# Patient Record
Sex: MALE | Race: WHITE | NOT HISPANIC OR LATINO | Employment: FULL TIME | URBAN - METROPOLITAN AREA
[De-identification: names, ages, dates, MRNs, and addresses within clinical notes are randomized per-mention and may not be internally consistent; named-entity substitution may affect disease eponyms.]

---

## 2017-02-25 ENCOUNTER — OFFICE VISIT (OUTPATIENT)
Dept: URGENT CARE | Facility: CLINIC | Age: 22
End: 2017-02-25
Payer: COMMERCIAL

## 2017-02-25 VITALS
TEMPERATURE: 98.2 F | BODY MASS INDEX: 27.32 KG/M2 | RESPIRATION RATE: 18 BRPM | WEIGHT: 170 LBS | OXYGEN SATURATION: 99 % | HEART RATE: 120 BPM | DIASTOLIC BLOOD PRESSURE: 72 MMHG | SYSTOLIC BLOOD PRESSURE: 128 MMHG | HEIGHT: 66 IN

## 2017-02-25 DIAGNOSIS — J32.9 OTHER SINUSITIS: ICD-10-CM

## 2017-02-25 PROCEDURE — 99214 OFFICE O/P EST MOD 30 MIN: CPT | Performed by: PHYSICIAN ASSISTANT

## 2017-02-25 RX ORDER — AZITHROMYCIN 250 MG/1
TABLET, FILM COATED ORAL
Qty: 6 TAB | Refills: 1 | Status: SHIPPED | OUTPATIENT
Start: 2017-02-25 | End: 2018-06-25

## 2017-02-25 RX ORDER — CIPROFLOXACIN HYDROCHLORIDE 3.5 MG/ML
SOLUTION/ DROPS TOPICAL
Qty: 5 ML | Refills: 0 | Status: SHIPPED | OUTPATIENT
Start: 2017-02-25 | End: 2018-06-25

## 2017-02-25 RX ORDER — GUAIFENESIN, PSEUDOEPHEDRINE HYDROCHLORIDE 600; 60 MG/1; MG/1
1 TABLET, EXTENDED RELEASE ORAL EVERY 12 HOURS
Qty: 14 TAB | Refills: 0 | Status: SHIPPED | OUTPATIENT
Start: 2017-02-25 | End: 2018-06-25

## 2017-02-25 ASSESSMENT — ENCOUNTER SYMPTOMS
SORE THROAT: 0
HEADACHES: 1
EYES NEGATIVE: 1
RESPIRATORY NEGATIVE: 1
CARDIOVASCULAR NEGATIVE: 1
SINUS PRESSURE: 1
COUGH: 0
CONSTITUTIONAL NEGATIVE: 1

## 2017-02-25 NOTE — MR AVS SNAPSHOT
"        Preez Julian   2017 4:30 PM   Office Visit   MRN: [id number]    Department:  [facility]   Dept Phone:  [phone]    Description:  Male : 1995   Provider:  John Grande PA-C           Reason for Visit     Nasal Congestion x 1 week having nasal congestion and drainage, cough with mucus, post nasal drip, eye discharge       Allergies as of 2017     Allergen Noted Reactions    Lorabid [Loracarbef] 2016       Penicillins 2016         You were diagnosed with     Other sinusitis   [[id number]]         Vital Signs     Blood Pressure Pulse Temperature Respirations Height Weight    128/72 mmHg 120 36.8 °C (98.2 °F) 18 1.676 m (5' 6\") 77.111 kg (170 lb)    Body Mass Index Oxygen Saturation Smoking Status             27.45 kg/m2 99% Never Smoker          Basic Information     Date Of Birth Sex Race Ethnicity Preferred Language    1995 Male White Non- English      Health Maintenance        Date Due Completion Dates    IMM HEP B VACCINE (1 of 3 - Primary Series) 1993 ---    IMM HEP A VACCINE (1 of 2 - Standard Series) 1994 ---    IMM HPV VACCINE (1 of 3 - Male 3 Dose Series) 2004 ---    IMM VARICELLA (CHICKENPOX) VACCINE (1 of 2 - 2 Dose Adolescent Series) 2006 ---    IMM DTaP/Tdap/Td Vaccine (1 - Tdap) 2012 ---    IMM INFLUENZA (1) 2016 ---            Current Immunizations     No immunizations on file.      Below and/or attached are the medications your provider expects you to take. Review all of your home medications and newly ordered medications with your provider and/or pharmacist. Follow medication instructions as directed by your provider and/or pharmacist. Please keep your medication list with you and share with your provider. Update the information when medications are discontinued, doses are changed, or new medications (including over-the-counter products) are added; and carry medication information at all times in the event of " emergency situations     Allergies:  LORABID - (reactions not documented)     PENICILLINS - (reactions not documented)               Medications  Valid as of: February 25, 2017 -  5:40 PM    Generic Name Brand Name Tablet Size Instructions for use    Azithromycin (Tab) ZITHROMAX 250 MG 2 tabs day number one then one tab daily for remaining 4 days        Azithromycin (Tab) ZITHROMAX 250 MG z-zahraa; U.D.        Cetirizine HCl   Take  by mouth.        Ciprofloxacin HCl (Solution) CILOXIN 0.3 % Place 1 gtt into affected eye[s] q3hrs        Omeprazole (CAPSULE DELAYED RELEASE) PRILOSEC 20 MG Take 1 Cap by mouth every day.        Pseudoephedrine-Guaifenesin (TABLET SR 12 HR) MUCINEX D  MG Take 1 Tab by mouth every 12 hours.        Sucralfate (Tab) CARAFATE 1 GM Take 1 Tab by mouth 4 Times a Day,Before Meals and at Bedtime.        .                 Medicines prescribed today were sent to:     [facility] [address] [place] [address] [address] [address] [address] [facility] [address]    Phone: [phone] Fax: [phone]    Open 24 Hours?: Yes      Medication refill instructions:       If your prescription bottle indicates you have medication refills left, it is not necessary to call your provider’s office. Please contact your pharmacy and they will refill your medication.    If your prescription bottle indicates you do not have any refills left, you may request refills at any time through one of the following ways: The online TESARO system (except Urgent Care), by calling your provider’s office, or by asking your pharmacy to contact your provider’s office with a refill request. Medication refills are processed only during regular business hours and may not be available until the next business day. Your provider may request additional information or to have a follow-up visit with you prior to refilling your medication.   *Please Note: Medication refills are assigned a new Rx number  when refilled electronically. Your pharmacy may indicate that no refills were authorized even though a new prescription for the same medication is available at the pharmacy. Please request the medicine by name with the pharmacy before contacting your provider for a refill.           [employer] Access Code: [email or web address]  Expires: 3/27/2017  5:40 PM    [employer]  A secure, online tool to manage your health information     [employer]’s [employer]® is a secure, online tool that connects you to your personalized health information from the privacy of your home -- day or night - making it very easy for you to manage your healthcare. Once the activation process is completed, you can even access your medical information using the [employer] samir, which is available for free in the Apple Samir store or Google Play store.     [employer] provides the following levels of access (as shown below):   My Chart Features   [facility] Doctor [facility]  Specialists [facility]  Urgent  Care Non-[facility]  Primary Care  Doctor   Email your healthcare team securely and privately 24/7 X X X    Manage appointments: schedule your next appointment; view details of past/upcoming appointments X      Request prescription refills. X      View recent personal medical records, including lab and immunizations X X X X   View health record, including health history, allergies, medications X X X X   Read reports about your outpatient visits, procedures, consult and ER notes X X X X   See your discharge summary, which is a recap of your hospital and/or ER visit that includes your diagnosis, lab results, and care plan. X X       How to register for [employer]:  1. Go to  [email or web address].  2. Click on the Sign Up Now box, which takes you to the New Member Sign Up page. You will need to provide the following information:  a. Enter your [employer] Access Code exactly as it appears at the top of this page. (You will not need to use this code after you’ve  completed the sign-up process. If you do not sign up before the expiration date, you must request a new code.)   b. Enter your date of birth.   c. Enter your home email address.   d. Click Submit, and follow the next screen’s instructions.  3. Create a [employer] ID. This will be your [employer] login ID and cannot be changed, so think of one that is secure and easy to remember.  4. Create a [employer] password. You can change your password at any time.  5. Enter your Password Reset Question and Answer. This can be used at a later time if you forget your password.   6. Enter your e-mail address. This allows you to receive e-mail notifications when new information is available in [employer].  7. Click Sign Up. You can now view your health information.    For assistance activating your [employer] account, call [phone]

## 2017-02-26 NOTE — PROGRESS NOTES
"Subjective:      Perez Jordan is a 21 y.o. male who presents with Nasal Congestion            Sinus Problem  This is a new problem. The current episode started in the past 7 days. The problem is unchanged. There has been no fever. The pain is moderate. Associated symptoms include congestion, headaches and sinus pressure. Pertinent negatives include no coughing or sore throat. Past treatments include nothing. The treatment provided no relief.       Review of Systems   Constitutional: Negative.    HENT: Positive for congestion and sinus pressure. Negative for sore throat.    Eyes: Negative.    Respiratory: Negative.  Negative for cough.    Cardiovascular: Negative.    Skin: Negative.    Neurological: Positive for headaches.          Objective:     /72 mmHg  Pulse 120  Temp(Src) 36.8 °C (98.2 °F)  Resp 18  Ht 1.676 m (5' 6\")  Wt 77.111 kg (170 lb)  BMI 27.45 kg/m2  SpO2 99%     Physical Exam   Constitutional: He is oriented to person, place, and time. He appears well-developed and well-nourished. No distress.   HENT:   Head: Normocephalic and atraumatic.   +maxill.sinus press.     Eyes: EOM are normal. Pupils are equal, round, and reactive to light.   Neck: Normal range of motion. Neck supple.   Cardiovascular: Normal rate.    Pulmonary/Chest: Effort normal. No respiratory distress.   Lymphadenopathy:     He has no cervical adenopathy.   Neurological: He is alert and oriented to person, place, and time.   Skin: Skin is warm and dry.   Psychiatric: He has a normal mood and affect. His behavior is normal. Judgment and thought content normal.   Nursing note and vitals reviewed.    Filed Vitals:    02/25/17 1719   BP: 128/72   Pulse: 120   Temp: 36.8 °C (98.2 °F)   Resp: 18   Height: 1.676 m (5' 6\")   Weight: 77.111 kg (170 lb)   SpO2: 99%     Active Ambulatory Problems     Diagnosis Date Noted   • No Active Ambulatory Problems     Resolved Ambulatory Problems     Diagnosis Date Noted   • No Resolved " Ambulatory Problems     No Additional Past Medical History     Current Outpatient Prescriptions on File Prior to Visit   Medication Sig Dispense Refill   • Cetirizine HCl (ZYRTEC ALLERGY PO) Take  by mouth.     • azithromycin (ZITHROMAX) 250 MG Tab 2 tabs day number one then one tab daily for remaining 4 days 6 Tab 0   • omeprazole (PRILOSEC) 20 MG delayed-release capsule Take 1 Cap by mouth every day. 14 Cap 0   • sucralfate (CARAFATE) 1 GM Tab Take 1 Tab by mouth 4 Times a Day,Before Meals and at Bedtime. 60 Tab 0     No current facility-administered medications on file prior to visit.     Gargles, Cepacol lozenges, Aleve/Advil as needed for throat pain  History reviewed. No pertinent family history.  Lorabid and Penicillins              Assessment/Plan:     ·  sinusitis      · MucinexD; nsaids; ceftin rx

## 2018-02-21 ENCOUNTER — OFFICE VISIT (OUTPATIENT)
Dept: URGENT CARE | Facility: CLINIC | Age: 23
End: 2018-02-21
Payer: COMMERCIAL

## 2018-02-21 VITALS
HEIGHT: 66 IN | SYSTOLIC BLOOD PRESSURE: 140 MMHG | OXYGEN SATURATION: 98 % | HEART RATE: 88 BPM | DIASTOLIC BLOOD PRESSURE: 88 MMHG | RESPIRATION RATE: 20 BRPM | TEMPERATURE: 97.2 F | WEIGHT: 172 LBS | BODY MASS INDEX: 27.64 KG/M2

## 2018-02-21 DIAGNOSIS — H61.23 BILATERAL IMPACTED CERUMEN: Primary | ICD-10-CM

## 2018-02-21 PROCEDURE — 99213 OFFICE O/P EST LOW 20 MIN: CPT | Performed by: PHYSICIAN ASSISTANT

## 2018-02-21 NOTE — PATIENT INSTRUCTIONS
Cerumen Impaction  The structures of the external ear canal secrete a waxy substance known as cerumen. Excess cerumen can build up in the ear canal, causing a condition known as cerumen impaction. Cerumen impaction can cause ear pain and disrupt the function of the ear.  The rate of cerumen production differs for each individual. In certain individuals, the configuration of the ear canal may decrease his or her ability to naturally remove cerumen.  CAUSES  Cerumen impaction is caused by excessive cerumen production or buildup.  RISK FACTORS  · Frequent use of swabs to clean ears.  · Having narrow ear canals.  · Having eczema.  · Being dehydrated.  SIGNS AND SYMPTOMS  · Diminished hearing.  · Ear drainage.  · Ear pain.  · Ear itch.  TREATMENT  Treatment may involve:  · Over-the-counter or prescription ear drops to soften the cerumen.  · Removal of cerumen by a health care provider. This may be done with:  ¨ Irrigation with warm water. This is the most common method of removal.  ¨ Ear curettes and other instruments.  ¨ Surgery. This may be done in severe cases.  HOME CARE INSTRUCTIONS  · Take medicines only as directed by your health care provider.  · Do not insert objects into the ear with the intent of cleaning the ear.  PREVENTION  · Do not insert objects into the ear, even with the intent of cleaning the ear. Removing cerumen as a part of normal hygiene is not necessary, and the use of swabs in the ear canal is not recommended.  · Drink enough water to keep your urine clear or pale yellow.  · Control your eczema if you have it.  SEEK MEDICAL CARE IF:  · You develop ear pain.  · You develop bleeding from the ear.  · The cerumen does not clear after you use ear drops as directed.     This information is not intended to replace advice given to you by your health care provider. Make sure you discuss any questions you have with your health care provider.     Document Released: 01/25/2006 Document Revised: 01/08/2016  Document Reviewed: 03/17/2011  [employer] Patient Education ©2016 [employer]

## 2018-02-21 NOTE — PROGRESS NOTES
Subjective:      Pt is a 22 y.o. male who presents with Ear Fullness (Bilateral ears wax)            HPI  Pt notes B/L ear fullness and states that he gets ear wax build up and now notes an issue. Pt has not taken any Rx medications for this condition. Pt states the pain is a 1/10, aching in nature and worse at night. Pt denies CP, SOB, NVD, paresthesias, headaches, dizziness, change in vision, hives, or other joint pain. The pt's medication list, problem list, and allergies have been evaluated and reviewed during today's visit.    PMH:  Negative per pt.      PSH:  Negative per pt.      Fam Hx:  the patient's family history is not pertinent to their current complaint    Soc HX:  Social History     Social History   • Marital status: Unknown     Spouse name: N/A   • Number of children: N/A   • Years of education: N/A     Occupational History   • Not on file.     Social History Main Topics   • Smoking status: Never Smoker   • Smokeless tobacco: Never Used   • Alcohol use Not on file   • Drug use: Unknown   • Sexual activity: Not on file     Other Topics Concern   • Not on file     Social History Narrative   • No narrative on file         Medications:    Current Outpatient Prescriptions:   •  pseudoephedrine-guaifenesin (MUCINEX D)  MG per tablet, Take 1 Tab by mouth every 12 hours., Disp: 14 Tab, Rfl: 0  •  ciprofloxacin (CILOXIN) 0.3 % Solution, Place 1 gtt into affected eye[s] q3hrs, Disp: 5 mL, Rfl: 0  •  azithromycin (ZITHROMAX) 250 MG Tab, z-zahraa; U.D., Disp: 6 Tab, Rfl: 1  •  Cetirizine HCl (ZYRTEC ALLERGY PO), Take  by mouth., Disp: , Rfl:   •  azithromycin (ZITHROMAX) 250 MG Tab, 2 tabs day number one then one tab daily for remaining 4 days, Disp: 6 Tab, Rfl: 0  •  omeprazole (PRILOSEC) 20 MG delayed-release capsule, Take 1 Cap by mouth every day., Disp: 14 Cap, Rfl: 0  •  sucralfate (CARAFATE) 1 GM Tab, Take 1 Tab by mouth 4 Times a Day,Before Meals and at Bedtime., Disp: 60 Tab, Rfl:  "0      Allergies:  Lorabid [loracarbef] and Penicillins    ROS  Constitutional: Negative for fever, chills and malaise/fatigue.   HENT: Negative for congestion and sore throat.  B/L cerumen Impaction  Eyes: Negative for blurred vision, double vision and photophobia.   Respiratory: Negative for cough and shortness of breath.    Cardiovascular: Negative for chest pain and palpitations.   Gastrointestinal: Negative for heartburn, nausea, vomiting, abdominal pain, diarrhea and constipation.   Genitourinary: Negative for dysuria and flank pain.   Musculoskeletal: Negative for joint pain and myalgias.   Skin: Negative for itching and rash.   Neurological: Negative for dizziness, tingling and headaches.   Endo/Heme/Allergies: Does not bruise/bleed easily.   Psychiatric/Behavioral: Negative for depression. The patient is not nervous/anxious.           Objective:     /88   Pulse 88   Temp 36.2 °C (97.2 °F)   Resp 20   Ht 1.676 m (5' 6\")   Wt 78 kg (172 lb)   SpO2 98%   BMI 27.76 kg/m²      Physical Exam    Constitutional: PT is oriented to person, place, and time. PT appears well-developed and well-nourished. No distress.   HENT:   Head: Normocephalic and atraumatic.   Mouth/Throat: Oropharynx is clear and moist. No oropharyngeal exudate.   Eyes: Conjunctivae normal and EOM are normal. Pupils are equal, round, and reactive to light.  EARS: B/L Cerumen impaction  Right: Hearing, tympanic membrane, external ear and ear canal normal.   Left: Hearing, tympanic membrane, external ear and ear canal normal.    Neck: Normal range of motion. Neck supple. No thyromegaly present.   Cardiovascular: Normal rate, regular rhythm, normal heart sounds and intact distal pulses.  Exam reveals no gallop and no friction rub.    No murmur heard.  Pulmonary/Chest: Effort normal and breath sounds normal. No respiratory distress. PT has no wheezes. PT has no rales. Pt exhibits no tenderness.   Abdominal: Soft. Bowel sounds are normal. " PT exhibits no distension and no mass. There is no tenderness. There is no rebound and no guarding.   Musculoskeletal: Normal range of motion. PT exhibits no edema and no tenderness.   Neurological: PT is alert and oriented to person, place, and time. PT has normal reflexes. No cranial nerve deficit.   Skin: Skin is warm and dry. No rash noted. PT is not diaphoretic. No erythema.       Psychiatric: PT has a normal mood and affect. PT behavior is normal. Judgment and thought content normal.                Assessment/Plan:     1. Bilateral impacted cerumen    Pt tolerated MA ear lavage well with both ears being cleaned and hearing returned almost immediately  Rest, fluids encouraged.  AVS with medical info given.  Pt was in full understanding and agreement with the plan.  Follow-up as needed if symptoms worsen or fail to improve.

## 2018-06-25 ENCOUNTER — OFFICE VISIT (OUTPATIENT)
Dept: MEDICAL GROUP | Facility: PHYSICIAN GROUP | Age: 23
End: 2018-06-25
Payer: COMMERCIAL

## 2018-06-25 VITALS
RESPIRATION RATE: 16 BRPM | DIASTOLIC BLOOD PRESSURE: 90 MMHG | HEART RATE: 104 BPM | WEIGHT: 171.6 LBS | BODY MASS INDEX: 27.58 KG/M2 | HEIGHT: 66 IN | OXYGEN SATURATION: 98 % | SYSTOLIC BLOOD PRESSURE: 120 MMHG | TEMPERATURE: 98.3 F

## 2018-06-25 DIAGNOSIS — R42 LIGHT HEADEDNESS: ICD-10-CM

## 2018-06-25 DIAGNOSIS — R53.83 FATIGUE, UNSPECIFIED TYPE: ICD-10-CM

## 2018-06-25 DIAGNOSIS — F43.21 ADJUSTMENT DISORDER WITH DEPRESSED MOOD: ICD-10-CM

## 2018-06-25 DIAGNOSIS — K21.9 GASTROESOPHAGEAL REFLUX DISEASE WITHOUT ESOPHAGITIS: ICD-10-CM

## 2018-06-25 PROCEDURE — 99214 OFFICE O/P EST MOD 30 MIN: CPT | Performed by: FAMILY MEDICINE

## 2018-06-25 ASSESSMENT — PATIENT HEALTH QUESTIONNAIRE - PHQ9
5. POOR APPETITE OR OVEREATING: 2 - MORE THAN HALF THE DAYS
CLINICAL INTERPRETATION OF PHQ2 SCORE: 1
SUM OF ALL RESPONSES TO PHQ QUESTIONS 1-9: 8

## 2018-06-25 NOTE — ASSESSMENT & PLAN NOTE
He has noted light headed feelings for several months.  The symptoms are worse after he skips a meal.  He has noted more symptoms since cutting back on sugar intake in the last week.

## 2018-06-25 NOTE — ASSESSMENT & PLAN NOTE
He took prilosec for 2 weeks after he had heartburn symptoms.  He stopped about a week ago and has not had recurrent symptoms.  He does reports some gassiness and decreased appetite.  His appetite has been gone for about a week.  He denies nausea.

## 2018-06-26 ENCOUNTER — HOSPITAL ENCOUNTER (OUTPATIENT)
Dept: LAB | Facility: MEDICAL CENTER | Age: 23
End: 2018-06-26
Attending: FAMILY MEDICINE
Payer: COMMERCIAL

## 2018-06-26 DIAGNOSIS — R42 LIGHT HEADEDNESS: ICD-10-CM

## 2018-06-26 DIAGNOSIS — K21.9 GASTROESOPHAGEAL REFLUX DISEASE WITHOUT ESOPHAGITIS: ICD-10-CM

## 2018-06-26 DIAGNOSIS — F43.21 ADJUSTMENT DISORDER WITH DEPRESSED MOOD: ICD-10-CM

## 2018-06-26 DIAGNOSIS — R53.83 FATIGUE, UNSPECIFIED TYPE: ICD-10-CM

## 2018-06-26 LAB
25(OH)D3 SERPL-MCNC: 19 NG/ML (ref 30–100)
ALBUMIN SERPL BCP-MCNC: 4.6 G/DL (ref 3.2–4.9)
ALBUMIN/GLOB SERPL: 1.4 G/DL
ALP SERPL-CCNC: 68 U/L (ref 30–99)
ALT SERPL-CCNC: 25 U/L (ref 2–50)
ANION GAP SERPL CALC-SCNC: 7 MMOL/L (ref 0–11.9)
AST SERPL-CCNC: 24 U/L (ref 12–45)
BILIRUB SERPL-MCNC: 1 MG/DL (ref 0.1–1.5)
BUN SERPL-MCNC: 10 MG/DL (ref 8–22)
CALCIUM SERPL-MCNC: 9.1 MG/DL (ref 8.5–10.5)
CHLORIDE SERPL-SCNC: 102 MMOL/L (ref 96–112)
CO2 SERPL-SCNC: 27 MMOL/L (ref 20–33)
CREAT SERPL-MCNC: 1.09 MG/DL (ref 0.5–1.4)
EST. AVERAGE GLUCOSE BLD GHB EST-MCNC: 105 MG/DL
GLOBULIN SER CALC-MCNC: 3.3 G/DL (ref 1.9–3.5)
GLUCOSE SERPL-MCNC: 86 MG/DL (ref 65–99)
HBA1C MFR BLD: 5.3 % (ref 0–5.6)
POTASSIUM SERPL-SCNC: 3.7 MMOL/L (ref 3.6–5.5)
PROT SERPL-MCNC: 7.9 G/DL (ref 6–8.2)
SODIUM SERPL-SCNC: 136 MMOL/L (ref 135–145)
TSH SERPL DL<=0.005 MIU/L-ACNC: 1.08 UIU/ML (ref 0.38–5.33)
VIT B12 SERPL-MCNC: 959 PG/ML (ref 211–911)

## 2018-06-26 PROCEDURE — 83516 IMMUNOASSAY NONANTIBODY: CPT

## 2018-06-26 PROCEDURE — 82306 VITAMIN D 25 HYDROXY: CPT

## 2018-06-26 PROCEDURE — 82607 VITAMIN B-12: CPT

## 2018-06-26 PROCEDURE — 84443 ASSAY THYROID STIM HORMONE: CPT

## 2018-06-26 PROCEDURE — 83036 HEMOGLOBIN GLYCOSYLATED A1C: CPT

## 2018-06-26 PROCEDURE — 82784 ASSAY IGA/IGD/IGG/IGM EACH: CPT

## 2018-06-26 PROCEDURE — 80053 COMPREHEN METABOLIC PANEL: CPT

## 2018-06-26 PROCEDURE — 36415 COLL VENOUS BLD VENIPUNCTURE: CPT

## 2018-06-26 NOTE — ASSESSMENT & PLAN NOTE
He has been experiencing some mood symptoms over the last few weeks.  He reports this has been occurring over holidays over the last few years since he lost his father to cancer.  Father's there was a week ago.  His PHQ is 8 today and he denies suicidal or homicidal ideation.  He feels that he is able to manage the mood symptoms and does not feel the medication is necessary

## 2018-06-26 NOTE — PROGRESS NOTES
CC: Light headedness, I think I have low blood sugar    HISTORY OF THE PRESENT ILLNESS: Patient is a 22 y.o. male. This pleasant patient is here today to discuss the following and establish care in this clinic    Health Maintenance: Reviewed, vaccine records needed      Gastroesophageal reflux disease without esophagitis  He took prilosec for 2 weeks after he had heartburn symptoms.  He stopped about a week ago and has not had recurrent symptoms.  He does reports some gassiness and decreased appetite.  His appetite has been gone for about a week.  He denies nausea.    Light headedness  He has noted light headed feelings for several months.  The symptoms are worse after he skips a meal.  He has noted more symptoms since cutting back on sugar intake in the last week.      Adjustment disorder with depressed mood  He has been experiencing some mood symptoms over the last few weeks.  He reports this has been occurring over holidays over the last few years since he lost his father to cancer.  Father's there was a week ago.  His PHQ is 8 today and he denies suicidal or homicidal ideation.  He feels that he is able to manage the mood symptoms and does not feel the medication is necessary      Allergies: Lorabid [loracarbef] and Penicillins    No current [facility]-ordered outpatient prescriptions on file.     No current [facility]-ordered facility-administered medications on file.        Past Medical History:   Diagnosis Date   • Gastroesophageal reflux disease without esophagitis 6/25/2018       Past Surgical History:   Procedure Laterality Date   • DENTAL EXTRACTION(S)      wisdom teeth   • EYE SURGERY      laser eye surgery for bad vision as a child       Social History   Substance Use Topics   • Smoking status: Never Smoker   • Smokeless tobacco: Never Used   • Alcohol use No       Social History     Social History Narrative    Works for [employer]       Family History   Problem Relation Age of Onset   • Depression Mother    • Lung Cancer  "Father    • Anxiety disorder Father    • Leukemia Maternal Grandmother    • Diabetes Maternal Grandfather    • Diabetes Paternal Grandfather    • Dementia Maternal Uncle 62       ROS:     - Constitutional: Negative for fever, chills, unexpected weight change     - HEENT: Negative for headaches, vision changes, hearing changes, ear pain, ear discharge, rhinorrhea, sinus congestion, sore throat, and neck pain.      - Respiratory: Negative for cough, sputum production, chest congestion, dyspnea, wheezing, and crackles.      - Cardiovascular: Negative for chest pain, palpitations, orthopnea, and bilateral lower extremity edema.     - Gastrointestinal: Negative for heartburn, nausea, vomiting, abdominal pain, hematochezia, melena, diarrhea, constipation, and greasy/foul-smelling stools.     - Genitourinary: Negative for dysuria, polyuria, hematuria, pyuria, urinary urgency, and urinary incontinence.    - Musculoskeletal: Negative for myalgias, back pain, and joint pain.     - Skin: Negative for rash, itching, cyanotic skin color change.     - Neurological: Negative for  tingling, tremors, focal sensory deficit, focal weakness and headaches.     - Endo/Heme/Allergies: Does not bruise/bleed easily.     - Psychiatric/Behavioral: Negative for suicidal/homicidal ideation and memory loss.       Exam: Blood pressure 120/90, pulse (!) 104, temperature 36.8 °C (98.3 °F), resp. rate 16, height 1.676 m (5' 6\"), weight 77.8 kg (171 lb 9.6 oz), SpO2 98 %. Body mass index is 27.7 kg/m².    General: Normal appearing. No distress.  HEENT: Normocephalic. Eyes conjunctiva clear lids without ptosis, pupils equal and reactive to light accommodation, ears normal shape and contour, canals are clear bilaterally, tympanic membranes are benign, nasal mucosa benign, oropharynx is without erythema, edema or exudates.   Neck: Supple without JVD or bruit. Thyroid is not enlarged.  Pulmonary: Clear to ausculation.  Normal effort. No rales, ronchi, or " wheezing.  Cardiovascular: Regular rate, rate 90, and rhythm without murmur. Carotid and radial pulses are intact and equal bilaterally.  Abdomen: Soft, nontender, nondistended. Normal bowel sounds. Liver and spleen are not palpable  Neurologic: Grossly nonfocal  Lymph: No cervical, supraclavicular or axillary lymph nodes are palpable  Skin: Warm and dry.  No obvious lesions.  Musculoskeletal: Normal gait. No extremity cyanosis, clubbing, or edema.  Psych: Normal mood and affect. Alert and oriented x3. Judgment and insight is normal.    Please note that this dictation was created using voice recognition software. I have made every reasonable attempt to correct obvious errors, but I expect that there are errors of grammar and possibly content that I did not discover before finalizing the note.      Assessment/Plan  1. Gastroesophageal reflux disease without esophagitis  His symptoms of heartburn have resolved after 2 week course of a PPI.  May have been having some stress induced reflux versus dyspepsia.  At this point he is still experiencing some gas.  It is reasonable to continue taking a probiotic.  Based on his symptoms we will evaluate for celiac disease  - T-TRANSGLUTAMINASE (TTG) IGA; Future  - IGA SERUM QUANT; Future    2. Light headedness  He describes episodes of lightheadedness that occur every few hours and improved with ingestion of carbohydrate.  It is possible that he is experiencing some blood glucose instability though it is unclear why as he does not have symptoms of diabetes does not appear to be at higher risk for it.  The differential includes other endocrine abnormalities such as thyroid disease orthostatic hypotension, or other cause of dizziness such as BPPV.  We will start with labs to evaluate for diabetes, metabolic dysfunction or thyroid disorder.  Dietary recommendations include eating complex meals and avoiding simple sugars.  - COMP METABOLIC PANEL; Future  - TSH; Future  - HEMOGLOBIN  A1C; Future    3. Adjustment disorder with depressed mood  He is describing some mild depressive symptoms that are in the setting of recent stressors and the loss of his father a few years ago.  If he is having some symptoms of depression we will evaluate labs to rule out metabolic causes such as a vitamin deficiency.  This point he is not a risk to himself and his symptoms are manageable.  We can consider medications if they worsen.  - VITAMIN D,25 HYDROXY; Future  - VITAMIN B12; Future    4. Fatigue, unspecified type  This may be multifactorial due to mood symptoms or a nutritional or metabolic cause.  Labs have been ordered to further evaluate this  .- TSH; Future

## 2018-06-27 LAB
IGA SERPL-MCNC: 266 MG/DL (ref 68–408)
TTG IGA SER IA-ACNC: 0 U/ML (ref 0–3)

## 2018-07-02 ENCOUNTER — OFFICE VISIT (OUTPATIENT)
Dept: MEDICAL GROUP | Facility: PHYSICIAN GROUP | Age: 23
End: 2018-07-02
Payer: COMMERCIAL

## 2018-07-02 VITALS
TEMPERATURE: 97.7 F | HEIGHT: 66 IN | HEART RATE: 86 BPM | WEIGHT: 175.4 LBS | SYSTOLIC BLOOD PRESSURE: 120 MMHG | OXYGEN SATURATION: 99 % | DIASTOLIC BLOOD PRESSURE: 88 MMHG | BODY MASS INDEX: 28.19 KG/M2 | RESPIRATION RATE: 18 BRPM

## 2018-07-02 DIAGNOSIS — K21.9 GASTROESOPHAGEAL REFLUX DISEASE WITHOUT ESOPHAGITIS: ICD-10-CM

## 2018-07-02 DIAGNOSIS — R42 LIGHT HEADEDNESS: ICD-10-CM

## 2018-07-02 DIAGNOSIS — E55.9 VITAMIN D DEFICIENCY: ICD-10-CM

## 2018-07-02 DIAGNOSIS — Z23 NEED FOR VACCINATION: ICD-10-CM

## 2018-07-02 DIAGNOSIS — F43.21 ADJUSTMENT DISORDER WITH DEPRESSED MOOD: ICD-10-CM

## 2018-07-02 PROCEDURE — 90715 TDAP VACCINE 7 YRS/> IM: CPT | Performed by: FAMILY MEDICINE

## 2018-07-02 PROCEDURE — 99214 OFFICE O/P EST MOD 30 MIN: CPT | Mod: 25 | Performed by: FAMILY MEDICINE

## 2018-07-02 PROCEDURE — 90471 IMMUNIZATION ADMIN: CPT | Performed by: FAMILY MEDICINE

## 2018-07-02 NOTE — ASSESSMENT & PLAN NOTE
He reports that he is not overly stressed and is no longer in a stressful living situation.  He denies depression or panic.

## 2018-07-02 NOTE — PROGRESS NOTES
CC: vitamin D deficiency    HISTORY OF PRESENT ILLNESS: Patient is a 22 y.o. male established patient who presents today to follow up his recent labs and the following    Health Maintenance: Tdap given, needs vaccine records    Gastroesophageal reflux disease without esophagitis  Symptoms have resolved after a 2 week course of prilosec.      Vitamin D deficiency  His vitamin D level was 19 in 6/2018.  He denies any symptoms related to this and is not taking any supplements.    Light headedness  He was seen two weeks ago for intermittent light headedness several hours after eating.  He reports his symptoms have improved gradually and are now resolved.  He gradually increased his intake and is now eating regularly and without symptoms.      Adjustment disorder with depressed mood  He reports that he is not overly stressed and is no longer in a stressful living situation.  He denies depression or panic.      Patient Active Problem List    Diagnosis Date Noted   • Vitamin D deficiency 07/02/2018   • Gastroesophageal reflux disease without esophagitis 06/25/2018   • Light headedness 06/25/2018      Allergies:Lorabid [loracarbef] and Penicillins    No current outpatient prescriptions on file.     No current facility-administered medications for this visit.        Social History   Substance Use Topics   • Smoking status: Never Smoker   • Smokeless tobacco: Never Used   • Alcohol use No     Social History     Social History Narrative    Works for [employer]       Family History   Problem Relation Age of Onset   • Depression Mother    • Lung Cancer Father    • Anxiety disorder Father    • Leukemia Maternal Grandmother    • Diabetes Maternal Grandfather    • Diabetes Paternal Grandfather    • Dementia Maternal Uncle 62   • Hypertension Maternal Uncle    • Hypertension Paternal Uncle        Review of Systems:      - Constitutional: Negative for fever, chills, unexpected weight change, and fatigue/generalized weakness.     - HEENT:  "Negative for headaches, vision changes, hearing changes, ear pain, ear discharge, rhinorrhea, sinus congestion, sore throat, and neck pain.      - Respiratory: Negative for cough, sputum production, chest congestion, dyspnea, wheezing, and crackles.      - Cardiovascular: Negative for chest pain, palpitations, orthopnea, and bilateral lower extremity edema.     - Gastrointestinal: Negative for heartburn, nausea, vomiting, abdominal pain, hematochezia, melena, diarrhea, constipation, and greasy/foul-smelling stools.     - Neurological: Negative for dizziness, tingling, tremors, focal sensory deficit, focal weakness and headaches.     - Psychiatric/Behavioral: Negative for depression, suicidal/homicidal ideation and memory loss.         Exam:    Blood pressure 120/88, pulse 86, temperature 36.5 °C (97.7 °F), resp. rate 18, height 1.676 m (5' 6\"), weight 79.6 kg (175 lb 6.4 oz), SpO2 99 %. Body mass index is 28.31 kg/m².    General:  Well nourished, well developed male in NAD  Head is grossly normal.  Neck: Supple without JVD or bruit. Thyroid is not enlarged.  Pulmonary: Clear to ausculation and percussion.  Normal effort. No rales, ronchi, or wheezing.  Cardiovascular: Regular rate and rhythm without murmur. Carotid and radial pulses are intact and equal bilaterally.  Extremities: no clubbing, cyanosis, or edema.    Please note that this dictation was created using voice recognition software. I have made every reasonable attempt to correct obvious errors, but I expect that there are errors of grammar and possibly content that I did not discover before finalizing the note.    Assessment/Plan:  1. Light headedness  This may have been hypoglycemia or reactive hypoglycemia in the setting of poor intake and a stressful situation.  His labs were reviewed with him and do not show evidence of diabetes, electrolyte abnormalities or thyroid dysfunction.  His vitamin B12 level is high.  He should continue to eat balanced meals " and avoid poor eating during stress.      2. Need for vaccination  - Tdap =>6yo IM    3. Gastroesophageal reflux disease without esophagitis  This is now resolved, likely mild gastritis in the setting of stress.    4. Vitamin D deficiency  We will start with 2000 units of vitamin D3 daily.  No repeat testing required as his levels are just barely low.    5. Adjustment disorder with depressed mood  Symptoms are nearly resolved and he does not have evidence of anxiety or depression.  Stress management discussed.

## 2018-07-02 NOTE — ASSESSMENT & PLAN NOTE
He was seen two weeks ago for intermittent light headedness several hours after eating.  He reports his symptoms have improved gradually and are now resolved.  He gradually increased his intake and is now eating regularly and without symptoms.

## 2018-07-02 NOTE — ASSESSMENT & PLAN NOTE
His vitamin D level was 19 in 6/2018.  He denies any symptoms related to this and is not taking any supplements.

## 2019-03-06 ENCOUNTER — OFFICE VISIT (OUTPATIENT)
Dept: URGENT CARE | Facility: CLINIC | Age: 24
End: 2019-03-06
Payer: COMMERCIAL

## 2019-03-06 VITALS
HEIGHT: 66 IN | RESPIRATION RATE: 20 BRPM | TEMPERATURE: 98 F | SYSTOLIC BLOOD PRESSURE: 130 MMHG | OXYGEN SATURATION: 100 % | HEART RATE: 88 BPM | DIASTOLIC BLOOD PRESSURE: 82 MMHG | WEIGHT: 172 LBS | BODY MASS INDEX: 27.64 KG/M2

## 2019-03-06 DIAGNOSIS — H69.92 DYSFUNCTION OF LEFT EUSTACHIAN TUBE: ICD-10-CM

## 2019-03-06 PROCEDURE — 99214 OFFICE O/P EST MOD 30 MIN: CPT | Performed by: NURSE PRACTITIONER

## 2019-03-06 RX ORDER — AZITHROMYCIN 250 MG/1
TABLET, FILM COATED ORAL
Qty: 6 TAB | Refills: 0 | Status: SHIPPED | OUTPATIENT
Start: 2019-03-06 | End: 2019-11-25

## 2019-03-06 ASSESSMENT — ENCOUNTER SYMPTOMS
ABDOMINAL PAIN: 0
FEVER: 0
SORE THROAT: 0
DOUBLE VISION: 0
MUSCULOSKELETAL NEGATIVE: 1
DIARRHEA: 0
COUGH: 0
BLURRED VISION: 0
NAUSEA: 0
WHEEZING: 0
DIZZINESS: 0
VOMITING: 0
CONSTIPATION: 0
CHILLS: 0
STRIDOR: 0
PALPITATIONS: 0
HEADACHES: 0

## 2019-03-06 NOTE — PROGRESS NOTES
Subjective:   Perez Jordan is a 23 y.o. male who presents for Otalgia (Left earache)        HPI   Patient with new onset left ear buzzing and feeling of fullness that started a few days ago. Symptoms have been intermittent and unchanged. Has not tried anything for relief. Denies alleviating or aggravating factors. Denies fever, cough or cold symptoms.    Review of Systems   Constitutional: Negative for chills and fever.   HENT: Positive for hearing loss and tinnitus. Negative for congestion, ear discharge, ear pain and sore throat.    Eyes: Negative for blurred vision and double vision.   Respiratory: Negative for cough, wheezing and stridor.    Cardiovascular: Negative for chest pain and palpitations.   Gastrointestinal: Negative for abdominal pain, constipation, diarrhea, nausea and vomiting.   Musculoskeletal: Negative.    Skin: Negative.  Negative for itching and rash.   Neurological: Negative for dizziness and headaches.   All other systems reviewed and are negative.    PMH:  has a past medical history of Gastroesophageal reflux disease without esophagitis (6/25/2018). He also has no past medical history of Allergy; Diabetes (HCC); or IBD (inflammatory bowel disease).  MEDS:   Current Outpatient Prescriptions:   •  azithromycin (ZITHROMAX) 250 MG Tab, Take two tabs po day one followed by one tab po day two through five with food, Disp: 6 Tab, Rfl: 0  ALLERGIES:   Allergies   Allergen Reactions   • Lorabid [Loracarbef]    • Penicillins      SURGHX:   Past Surgical History:   Procedure Laterality Date   • DENTAL EXTRACTION(S)      wisdom teeth   • EYE SURGERY      laser eye surgery for bad vision as a child     SOCHX:  reports that he has never smoked. He has never used smokeless tobacco. He reports that he uses drugs, including Marijuana. He reports that he does not drink alcohol.  FH: Family history was reviewed, no pertinent findings to report     Objective:   /82 (BP Location: Right arm, Patient  "Position: Sitting, BP Cuff Size: Adult)   Pulse 88   Temp 36.7 °C (98 °F) (Temporal)   Resp 20   Ht 1.676 m (5' 6\")   Wt 78 kg (172 lb)   SpO2 100%   BMI 27.76 kg/m²   Physical Exam   Constitutional: He is oriented to person, place, and time. He appears well-developed and well-nourished. No distress.   HENT:   Head: Normocephalic.   Right Ear: Hearing, tympanic membrane and ear canal normal. Tympanic membrane is not erythematous. No middle ear effusion.   Left Ear: Hearing and ear canal normal. Tympanic membrane is retracted. Tympanic membrane is not erythematous. Tympanic membrane mobility is abnormal. A middle ear effusion is present.   Nose: No rhinorrhea. Right sinus exhibits no maxillary sinus tenderness and no frontal sinus tenderness. Left sinus exhibits no maxillary sinus tenderness and no frontal sinus tenderness.   Mouth/Throat: Oropharynx is clear and moist and mucous membranes are normal.   Eyes: Pupils are equal, round, and reactive to light. Conjunctivae, EOM and lids are normal.   Neck: Normal range of motion. No thyromegaly present.   Cardiovascular: Normal rate, regular rhythm and normal heart sounds.    Pulmonary/Chest: Effort normal and breath sounds normal. No respiratory distress. He has no wheezes.   Lymphadenopathy:        Head (right side): No submandibular and no tonsillar adenopathy present.        Head (left side): No submandibular and no tonsillar adenopathy present.   Neurological: He is alert and oriented to person, place, and time.   Skin: Skin is warm and dry. He is not diaphoretic.   Psychiatric: He has a normal mood and affect. His behavior is normal. Judgment and thought content normal.   Vitals reviewed.        Assessment/Plan:   Assessment    1. Dysfunction of left eustachian tube  - azithromycin (ZITHROMAX) 250 MG Tab; Take two tabs po day one followed by one tab po day two through five with food  Dispense: 6 Tab; Refill: 0    Patient encouraged to increase clear liquid " intake  You may use over-the-counter Zyrtec or Claritin daily for relief of symptoms; follow the package instructions for dosing.    Differential diagnosis, natural history, supportive care, and indications for immediate follow-up discussed.

## 2019-11-25 ENCOUNTER — OFFICE VISIT (OUTPATIENT)
Dept: MEDICAL GROUP | Facility: PHYSICIAN GROUP | Age: 24
End: 2019-11-25
Payer: COMMERCIAL

## 2019-11-25 VITALS
TEMPERATURE: 98.4 F | BODY MASS INDEX: 27.32 KG/M2 | HEART RATE: 78 BPM | RESPIRATION RATE: 18 BRPM | SYSTOLIC BLOOD PRESSURE: 126 MMHG | WEIGHT: 170 LBS | HEIGHT: 66 IN | OXYGEN SATURATION: 99 % | DIASTOLIC BLOOD PRESSURE: 64 MMHG

## 2019-11-25 DIAGNOSIS — Z00.00 WELLNESS EXAMINATION: ICD-10-CM

## 2019-11-25 DIAGNOSIS — Z23 NEED FOR VACCINATION: ICD-10-CM

## 2019-11-25 DIAGNOSIS — K60.2 ANAL FISSURE: ICD-10-CM

## 2019-11-25 DIAGNOSIS — K21.9 GASTROESOPHAGEAL REFLUX DISEASE WITHOUT ESOPHAGITIS: ICD-10-CM

## 2019-11-25 PROBLEM — R42 LIGHT HEADEDNESS: Status: RESOLVED | Noted: 2018-06-25 | Resolved: 2019-11-25

## 2019-11-25 PROBLEM — K92.1 HEMATOCHEZIA: Status: ACTIVE | Noted: 2019-11-25

## 2019-11-25 PROCEDURE — 99395 PREV VISIT EST AGE 18-39: CPT | Mod: 25 | Performed by: FAMILY MEDICINE

## 2019-11-25 PROCEDURE — 90471 IMMUNIZATION ADMIN: CPT | Performed by: FAMILY MEDICINE

## 2019-11-25 PROCEDURE — 90686 IIV4 VACC NO PRSV 0.5 ML IM: CPT | Performed by: FAMILY MEDICINE

## 2019-11-25 PROCEDURE — 99212 OFFICE O/P EST SF 10 MIN: CPT | Mod: 25 | Performed by: FAMILY MEDICINE

## 2019-11-25 SDOH — HEALTH STABILITY: PHYSICAL HEALTH: ON AVERAGE, HOW MANY MINUTES DO YOU ENGAGE IN EXERCISE AT THIS LEVEL?: 60 MIN

## 2019-11-25 SDOH — HEALTH STABILITY: MENTAL HEALTH
STRESS IS WHEN SOMEONE FEELS TENSE, NERVOUS, ANXIOUS, OR CAN'T SLEEP AT NIGHT BECAUSE THEIR MIND IS TROUBLED. HOW STRESSED ARE YOU?: NOT AT ALL

## 2019-11-25 SDOH — HEALTH STABILITY: PHYSICAL HEALTH: ON AVERAGE, HOW MANY DAYS PER WEEK DO YOU ENGAGE IN MODERATE TO STRENUOUS EXERCISE (LIKE A BRISK WALK)?: 5 DAYS

## 2019-11-25 ASSESSMENT — PATIENT HEALTH QUESTIONNAIRE - PHQ9: CLINICAL INTERPRETATION OF PHQ2 SCORE: 0

## 2019-11-25 NOTE — ASSESSMENT & PLAN NOTE
He has had several episodes of bright red blood I his stool over the last few weeks.  This started after he had some constipation on a higher protein diet.  He feels a small amount of pain and then notes the blood.  This is occurring every 2-3 weeks.  He started taking metamucil daily and he has had less symptoms.  He is drinking 60 oz or more daily.

## 2019-11-25 NOTE — PROGRESS NOTES
CC: well visit, hematochezia    HISTORY OF PRESENT ILLNESS: Patient is a 24 y.o. male established patient who presents today to discuss the following and have a wellness visit    Health Maintenance: Completed    Gastroesophageal reflux disease without esophagitis  He has improved significantly after stopping caffeine and marijuana.  He has not had alcohol in 3 years.  He is no longer on prilosec  Some foods cause minor symptoms, but he is managing this.    Anal fissure  He has had several episodes of bright red blood I his stool over the last few weeks.  This started after he had some constipation on a higher protein diet.  He feels a small amount of pain and then notes the blood.  This is occurring every 2-3 weeks.  He started taking metamucil daily and he has had less symptoms.  He is drinking 60 oz or more daily.    Wellness examination  He is here for a wellness examination.  He is doing well and denies significant stress or other concerns.  He is exercising 5 days a week for an hour and has lost weight.  He is following a healthier diet with less added sugar and higher in protein.        Patient Active Problem List    Diagnosis Date Noted   • Anal fissure 11/25/2019   • Wellness examination 11/25/2019   • Vitamin D deficiency 07/02/2018   • Gastroesophageal reflux disease without esophagitis 06/25/2018      Allergies:Lorabid [loracarbef] and Penicillins    No current outpatient medications on file.     No current facility-administered medications for this visit.        Social History     Tobacco Use   • Smoking status: Never Smoker   • Smokeless tobacco: Never Used   Substance Use Topics   • Alcohol use: No     Alcohol/week: 0.0 oz   • Drug use: Not Currently     Types: Marijuana     Social History     Patient does not qualify to have social determinant information on file (likely too young).   Social History Narrative    Works for [employer]       Family History   Problem Relation Age of Onset   • Depression Mother  "   • Lung Cancer Father 59        smoker   • Anxiety disorder Father    • Leukemia Maternal Grandmother    • Diabetes Maternal Grandfather    • Diabetes Paternal Grandfather    • Heart Disease Paternal Grandfather 75   • Dementia Maternal Uncle 62   • Hypertension Maternal Uncle    • Hypertension Paternal Uncle    • Diabetes Paternal Uncle    • Stroke Maternal Uncle 64       Review of Systems:      - Constitutional: Negative for fever, chills, unexpected weight change, and fatigue/generalized weakness.     - HEENT: Negative for headaches, vision changes, hearing changes, ear pain, ear discharge, rhinorrhea, sinus congestion, sore throat, and neck pain.      - Respiratory: Negative for cough, sputum production, chest congestion, dyspnea, wheezing, and crackles.      - Cardiovascular: Negative for chest pain, palpitations, orthopnea, and bilateral lower extremity edema.     - Gastrointestinal: Negative for heartburn, nausea, vomiting, abdominal pain, melena, diarrhea, and greasy/foul-smelling stools.     - Genitourinary: Negative for dysuria, polyuria, hematuria, pyuria, urinary urgency, and urinary incontinence.    - Musculoskeletal: Negative for myalgias, back pain, and joint pain.     - Skin: Negative for rash, itching, cyanotic skin color change.     - Neurological: Negative for dizziness, tingling, tremors, focal sensory deficit, focal weakness and headaches.     - Endo/Heme/Allergies: Does not bruise/bleed easily.     - Psychiatric/Behavioral: Negative for depression, suicidal/homicidal ideation and memory loss.        Exam:    /64   Pulse 78   Temp 36.9 °C (98.4 °F) (Temporal)   Resp 18   Ht 1.676 m (5' 6\")   Wt 77.1 kg (170 lb)   SpO2 99%  Body mass index is 27.44 kg/m².    General:  Well nourished, well developed male in NAD  Head is grossly normal.  Neck: Supple without JVD or bruit. Thyroid is not enlarged.  Pulmonary: Clear to ausculation and percussion.  Normal effort. No rales, ronchi, or " wheezing.  Cardiovascular: Regular rate and rhythm without murmur. Carotid and radial pulses are intact and equal bilaterally.  Extremities: no clubbing, cyanosis, or edema.  Rectal: normal anal region without external hemorrhoids, anoscopy reveals an internal fissure at 12 o'clock.    Assessment/Plan:  1. Wellness examination  A wellness examination was conducted today.  The following risk factors were identified: slightly overweight body weight.  Routine screening was discussed and ordered as listed below.  Recommendations for exercise and healthy diet were discussed.  Will check a screening lipid panel, has had other labs recently.  - Lipid Profile; Future    2. Need for vaccination  - Influenza Vaccine Quad Injection (PF)    3. Gastroesophageal reflux disease without esophagitis  This is controlled with lifestyle, no alarm features.    4. Anal fissure  This is the likely cause of his hematochezia.  We reviewed management.  At this time it should heal with avoidance of constipation.  Continue fiber intake and/or supplementation to keep stools soft.  If bleeding continues on this plan consider referral for colonoscopy or sigmoidoscopy.  He agrees with this plan.  Also return for unexpected weight loss, appetite changes, diarrhea or fevers.

## 2019-11-25 NOTE — ASSESSMENT & PLAN NOTE
He has improved significantly after stopping caffeine and marijuana.  He has not had alcohol in 3 years.  He is no longer on prilosec  Some foods cause minor symptoms, but he is managing this.

## 2019-11-25 NOTE — ASSESSMENT & PLAN NOTE
He is here for a wellness examination.  He is doing well and denies significant stress or other concerns.  He is exercising 5 days a week for an hour and has lost weight.  He is following a healthier diet with less added sugar and higher in protein.

## 2024-02-01 ENCOUNTER — APPOINTMENT (RX ONLY)
Dept: URBAN - METROPOLITAN AREA CLINIC 4 | Facility: CLINIC | Age: 29
Setting detail: DERMATOLOGY
End: 2024-02-01

## 2024-02-01 DIAGNOSIS — D22 MELANOCYTIC NEVI: ICD-10-CM

## 2024-02-01 DIAGNOSIS — L81.4 OTHER MELANIN HYPERPIGMENTATION: ICD-10-CM

## 2024-02-01 DIAGNOSIS — D485 NEOPLASM OF UNCERTAIN BEHAVIOR OF SKIN: ICD-10-CM | Status: INADEQUATELY CONTROLLED

## 2024-02-01 DIAGNOSIS — D18.0 HEMANGIOMA: ICD-10-CM

## 2024-02-01 DIAGNOSIS — Z71.89 OTHER SPECIFIED COUNSELING: ICD-10-CM

## 2024-02-01 DIAGNOSIS — L82.1 OTHER SEBORRHEIC KERATOSIS: ICD-10-CM

## 2024-02-01 PROBLEM — D48.5 NEOPLASM OF UNCERTAIN BEHAVIOR OF SKIN: Status: ACTIVE | Noted: 2024-02-01

## 2024-02-01 PROBLEM — D18.01 HEMANGIOMA OF SKIN AND SUBCUTANEOUS TISSUE: Status: ACTIVE | Noted: 2024-02-01

## 2024-02-01 PROBLEM — D22.5 MELANOCYTIC NEVI OF TRUNK: Status: ACTIVE | Noted: 2024-02-01

## 2024-02-01 PROCEDURE — 11102 TANGNTL BX SKIN SINGLE LES: CPT

## 2024-02-01 PROCEDURE — 99203 OFFICE O/P NEW LOW 30 MIN: CPT | Mod: 25

## 2024-02-01 PROCEDURE — ? COUNSELING

## 2024-02-01 PROCEDURE — ? BIOPSY BY SHAVE METHOD

## 2024-02-01 PROCEDURE — ? SUNSCREEN RECOMMENDATIONS

## 2024-02-01 ASSESSMENT — LOCATION SIMPLE DESCRIPTION DERM
LOCATION SIMPLE: RIGHT ANTERIOR NECK
LOCATION SIMPLE: LEFT UPPER BACK

## 2024-02-01 ASSESSMENT — LOCATION ZONE DERM
LOCATION ZONE: NECK
LOCATION ZONE: TRUNK

## 2024-02-01 ASSESSMENT — LOCATION DETAILED DESCRIPTION DERM
LOCATION DETAILED: RIGHT CLAVICULAR NECK
LOCATION DETAILED: LEFT SUPERIOR UPPER BACK